# Patient Record
Sex: MALE | Race: WHITE | URBAN - METROPOLITAN AREA
[De-identification: names, ages, dates, MRNs, and addresses within clinical notes are randomized per-mention and may not be internally consistent; named-entity substitution may affect disease eponyms.]

---

## 2017-03-01 ENCOUNTER — OFFICE VISIT (OUTPATIENT)
Dept: FAMILY MEDICINE | Facility: CLINIC | Age: 32
End: 2017-03-01
Payer: COMMERCIAL

## 2017-03-01 VITALS
HEIGHT: 76 IN | DIASTOLIC BLOOD PRESSURE: 86 MMHG | RESPIRATION RATE: 12 BRPM | SYSTOLIC BLOOD PRESSURE: 138 MMHG | OXYGEN SATURATION: 99 % | BODY MASS INDEX: 24.13 KG/M2 | HEART RATE: 80 BPM | WEIGHT: 198.2 LBS | TEMPERATURE: 97.5 F

## 2017-03-01 DIAGNOSIS — H00.012 HORDEOLUM EXTERNUM OF RIGHT LOWER EYELID: Primary | ICD-10-CM

## 2017-03-01 PROCEDURE — 99202 OFFICE O/P NEW SF 15 MIN: CPT | Performed by: PHYSICIAN ASSISTANT

## 2017-03-01 NOTE — MR AVS SNAPSHOT
After Visit Summary   3/1/2017    Harsha Beauchamp    MRN: [id number]           Patient Information     Date Of Birth          1985        Visit Information        Provider Department      3/1/2017 6:40 PM Lisseth Garcia PA-C [facility]        Today's Diagnoses     Hordeolum externum of right lower eyelid    -  1      Care Instructions    Follow up with ophthamology at  [facility] at [phone].              Follow-ups after your visit        Additional Services     OPHTHALMOLOGY ADULT REFERRAL       Your provider has referred you to:  [facility]: [facility] [phone]   [email or web address]  [place] [phone]   [email or web address]      Please be aware that coverage of these services is subject to the terms and limitations of your health insurance plan.  Call member services at your health plan with any benefit or coverage questions.      Please bring the following to your appointment:  >>   Any x-rays, CTs or MRIs which have been performed.  Contact the facility where they were done to arrange for  prior to your scheduled appointment.  Any new CT, MRI or other procedures ordered by your specialist must be performed at a [place] facility or coordinated by your clinic's referral office.    >>   List of current medications   >>   This referral request   >>   Any documents/labs given to you for this referral                  Who to contact     If you have questions or need follow up information about today's clinic visit or your schedule please contact [facility] directly at [phone].  Normal or non-critical lab and imaging results will be communicated to you by MyChart, letter or phone within 4 business days after the clinic has received the results. If you do not hear from us within 7 days, please contact the clinic through MyChart or phone. If you have a critical or  "abnormal lab result, we will notify you by phone as soon as possible.  Submit refill requests through stylemarks or call your pharmacy and they will forward the refill request to us. Please allow 3 business days for your refill to be completed.          Additional Information About Your Visit        MyChart Information     stylemarks lets you send messages to your doctor, view your test results, renew your prescriptions, schedule appointments and more. To sign up, go to [email or web address] . Click on \"Log in\" on the left side of the screen, which will take you to the Welcome page. Then click on \"Sign up Now\" on the right side of the page.     You will be asked to enter the access code listed below, as well as some personal information. Please follow the directions to create your username and password.     Your access code is: [email or web address]  Expires: 2017  6:44 PM     Your access code will  in 90 days. If you need help or a new code, please call your [facility] or [phone].        Care EveryWhere ID     This is your Care EveryWhere ID. This could be used by other organizations to access your [place] medical records  [id number]        Your Vitals Were     Pulse Temperature Respirations Height Pulse Oximetry BMI (Body Mass Index)    80 97.5  F (36.4  C) (Oral) 12 1.93 m (6' 4\") 99% 24.13 kg/m2       Blood Pressure from Last 3 Encounters:   17 138/86    Weight from Last 3 Encounters:   17 89.9 kg (198 lb 3.2 oz)              We Performed the Following     OPHTHALMOLOGY ADULT REFERRAL        Primary Care Provider    None Specified       No primary provider on file.        Thank you!     Thank you for choosing [facility]  for your care. Our goal is always to provide you with excellent care. Hearing back from our patients is one way we can continue to improve our services. Please take a few minutes to complete the written survey that you may receive in the mail after your " visit with us. Thank you!             Your Updated Medication List - Protect others around you: Learn how to safely use, store and throw away your medicines at www.disposemymeds.org.      Notice  As of 3/1/2017  6:45 PM    You have not been prescribed any medications.

## 2017-03-02 NOTE — NURSING NOTE
"Chief Complaint   Patient presents with     Mass       Initial /82  Pulse 80  Temp 97.5  F (36.4  C) (Oral)  Resp 12  Ht 1.93 m (6' 4\")  Wt 89.9 kg (198 lb 3.2 oz)  SpO2 99%  BMI 24.13 kg/m2 Estimated body mass index is 24.13 kg/(m^2) as calculated from the following:    Height as of this encounter: 1.93 m (6' 4\").    Weight as of this encounter: 89.9 kg (198 lb 3.2 oz).  Medication Reconciliation: kenny Cochran        "

## 2017-03-02 NOTE — PROGRESS NOTES
"  SUBJECTIVE:                                                    Harsha Beauchamp is a 31 year old male who presents to clinic today for the following health issues:      Concern - RT eye mass     Onset: Since August but got worse 2 weeks ago    Description:   Has gotten worse over the past 2 weeks, has increased in size and it red     Intensity: moderate    Progression of Symptoms:  worsening    Accompanying Signs & Symptoms:  no       Previous history of similar problem:   no    Precipitating factors:   Worsened by: unsure    Alleviating factors:  Improved by: nothing       Therapies Tried and outcome: warm compress unsure if it helped or made it worse      Right lower eyelid not painful but irritated. Reports started last august as \"tiny ball\" but dramatically increased in size over the last 2 weeks.  No vision changes.   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   No medications on regular basis     Problem list and histories reviewed & adjusted, as indicated.  Additional history: as documented    There is no problem list on file for this patient.    History reviewed. No pertinent past surgical history.    Social History   Substance Use Topics     Smoking status: Never Smoker     Smokeless tobacco: Not on file     Alcohol use Yes     History reviewed. No pertinent family history.      No current outpatient prescriptions on file.       Reviewed and updated as needed this visit by clinical staff  Tobacco  Meds  Med Hx  Surg Hx  Fam Hx  Soc Hx      Reviewed and updated as needed this visit by Provider         ROS:  Constitutional, HEENT, cardiovascular, pulmonary, gi and gu systems are negative, except as otherwise noted.    OBJECTIVE:                                                    /86  Pulse 80  Temp 97.5  F (36.4  C) (Oral)  Resp 12  Ht 1.93 m (6' 4\")  Wt 89.9 kg (198 lb 3.2 oz)  SpO2 99%  BMI 24.13 kg/m2  Body " mass index is 24.13 kg/(m^2).  GENERAL: healthy, alert and no distress  EYES: PERRL, EOMI and eyelids-right inferior eyelid  Approximately 15mm hard mucopurulent edema of right lower lid with erythema not particularly tender to palpation  HENT: ear canals and TM's normal, nose and mouth without ulcers or lesions  NECK: no adenopathy, no asymmetry, masses, or scars and thyroid normal to palpation  RESP: lungs clear to auscultation - no rales, rhonchi or wheezes  CV: regular rate and rhythm, normal S1 S2, no S3 or S4, no murmur, click or rub, no peripheral edema and peripheral pulses strong  MS: no gross musculoskeletal defects noted, no edema    Diagnostic Test Results:  none      ASSESSMENT/PLAN:                                                            1. Hordeolum externum of right lower eyelid  Advised to call us if difficulty getting into eye clinic in the next couple of days.  Return urgently if any change in symptoms.  Warning signs and symptoms reviewed.   - OPHTHALMOLOGY ADULT REFERRAL    Patient Instructions   Follow up with ophthamology at  [facility] at [phone].             Lisseth Garcia PA-C  [facility]

## 2017-03-06 ENCOUNTER — TRANSFERRED RECORDS (OUTPATIENT)
Dept: HEALTH INFORMATION MANAGEMENT | Facility: CLINIC | Age: 32
End: 2017-03-06